# Patient Record
Sex: MALE | Race: OTHER | NOT HISPANIC OR LATINO | URBAN - METROPOLITAN AREA
[De-identification: names, ages, dates, MRNs, and addresses within clinical notes are randomized per-mention and may not be internally consistent; named-entity substitution may affect disease eponyms.]

---

## 2020-09-02 ENCOUNTER — EMERGENCY (EMERGENCY)
Facility: HOSPITAL | Age: 25
LOS: 1 days | Discharge: HOME | End: 2020-09-02
Attending: EMERGENCY MEDICINE | Admitting: EMERGENCY MEDICINE
Payer: MEDICAID

## 2020-09-02 VITALS
WEIGHT: 139.99 LBS | OXYGEN SATURATION: 98 % | DIASTOLIC BLOOD PRESSURE: 97 MMHG | HEIGHT: 67 IN | SYSTOLIC BLOOD PRESSURE: 145 MMHG | HEART RATE: 97 BPM | RESPIRATION RATE: 18 BRPM | TEMPERATURE: 98 F

## 2020-09-02 LAB
ALBUMIN SERPL ELPH-MCNC: 4 G/DL — SIGNIFICANT CHANGE UP (ref 3.4–5)
ALP SERPL-CCNC: 72 U/L — SIGNIFICANT CHANGE UP (ref 40–120)
ALT FLD-CCNC: 20 U/L — SIGNIFICANT CHANGE UP (ref 12–42)
AMPHET UR-MCNC: NEGATIVE — SIGNIFICANT CHANGE UP
ANION GAP SERPL CALC-SCNC: 9 MMOL/L — SIGNIFICANT CHANGE UP (ref 9–16)
APPEARANCE UR: CLEAR — SIGNIFICANT CHANGE UP
APTT BLD: 31.9 SEC — SIGNIFICANT CHANGE UP (ref 27.5–35.5)
AST SERPL-CCNC: 15 U/L — SIGNIFICANT CHANGE UP (ref 15–37)
BACTERIA # UR AUTO: PRESENT /HPF
BARBITURATES UR SCN-MCNC: NEGATIVE — SIGNIFICANT CHANGE UP
BASOPHILS # BLD AUTO: 0.03 K/UL — SIGNIFICANT CHANGE UP (ref 0–0.2)
BASOPHILS NFR BLD AUTO: 0.5 % — SIGNIFICANT CHANGE UP (ref 0–2)
BENZODIAZ UR-MCNC: NEGATIVE — SIGNIFICANT CHANGE UP
BILIRUB SERPL-MCNC: 0.4 MG/DL — SIGNIFICANT CHANGE UP (ref 0.2–1.2)
BILIRUB UR-MCNC: NEGATIVE — SIGNIFICANT CHANGE UP
BUN SERPL-MCNC: 15 MG/DL — SIGNIFICANT CHANGE UP (ref 7–23)
CALCIUM SERPL-MCNC: 8.8 MG/DL — SIGNIFICANT CHANGE UP (ref 8.5–10.5)
CHLORIDE SERPL-SCNC: 104 MMOL/L — SIGNIFICANT CHANGE UP (ref 96–108)
CO2 SERPL-SCNC: 28 MMOL/L — SIGNIFICANT CHANGE UP (ref 22–31)
COCAINE METAB.OTHER UR-MCNC: NEGATIVE — SIGNIFICANT CHANGE UP
COLOR SPEC: YELLOW — SIGNIFICANT CHANGE UP
CREAT SERPL-MCNC: 1.17 MG/DL — SIGNIFICANT CHANGE UP (ref 0.5–1.3)
D DIMER BLD IA.RAPID-MCNC: <187 NG/ML DDU — SIGNIFICANT CHANGE UP
DIFF PNL FLD: ABNORMAL
EOSINOPHIL # BLD AUTO: 0.05 K/UL — SIGNIFICANT CHANGE UP (ref 0–0.5)
EOSINOPHIL NFR BLD AUTO: 0.8 % — SIGNIFICANT CHANGE UP (ref 0–6)
EPI CELLS # UR: SIGNIFICANT CHANGE UP /HPF (ref 0–5)
GLUCOSE SERPL-MCNC: 94 MG/DL — SIGNIFICANT CHANGE UP (ref 70–99)
GLUCOSE UR QL: NEGATIVE — SIGNIFICANT CHANGE UP
HCT VFR BLD CALC: 42.2 % — SIGNIFICANT CHANGE UP (ref 39–50)
HGB BLD-MCNC: 14.2 G/DL — SIGNIFICANT CHANGE UP (ref 13–17)
IMM GRANULOCYTES NFR BLD AUTO: 0.3 % — SIGNIFICANT CHANGE UP (ref 0–1.5)
KETONES UR-MCNC: NEGATIVE — SIGNIFICANT CHANGE UP
LACTATE SERPL-SCNC: 1.1 MMOL/L — SIGNIFICANT CHANGE UP (ref 0.4–2)
LEUKOCYTE ESTERASE UR-ACNC: NEGATIVE — SIGNIFICANT CHANGE UP
LIDOCAIN IGE QN: 85 U/L — SIGNIFICANT CHANGE UP (ref 73–393)
LYMPHOCYTES # BLD AUTO: 2.6 K/UL — SIGNIFICANT CHANGE UP (ref 1–3.3)
LYMPHOCYTES # BLD AUTO: 41.5 % — SIGNIFICANT CHANGE UP (ref 13–44)
MCHC RBC-ENTMCNC: 30.3 PG — SIGNIFICANT CHANGE UP (ref 27–34)
MCHC RBC-ENTMCNC: 33.6 GM/DL — SIGNIFICANT CHANGE UP (ref 32–36)
MCV RBC AUTO: 90 FL — SIGNIFICANT CHANGE UP (ref 80–100)
METHADONE UR-MCNC: NEGATIVE — SIGNIFICANT CHANGE UP
MONOCYTES # BLD AUTO: 0.32 K/UL — SIGNIFICANT CHANGE UP (ref 0–0.9)
MONOCYTES NFR BLD AUTO: 5.1 % — SIGNIFICANT CHANGE UP (ref 2–14)
NEUTROPHILS # BLD AUTO: 3.24 K/UL — SIGNIFICANT CHANGE UP (ref 1.8–7.4)
NEUTROPHILS NFR BLD AUTO: 51.8 % — SIGNIFICANT CHANGE UP (ref 43–77)
NITRITE UR-MCNC: NEGATIVE — SIGNIFICANT CHANGE UP
NRBC # BLD: 0 /100 WBCS — SIGNIFICANT CHANGE UP (ref 0–0)
NT-PROBNP SERPL-SCNC: 15 PG/ML — SIGNIFICANT CHANGE UP
OPIATES UR-MCNC: NEGATIVE — SIGNIFICANT CHANGE UP
PCP SPEC-MCNC: SIGNIFICANT CHANGE UP
PCP UR-MCNC: NEGATIVE — SIGNIFICANT CHANGE UP
PH UR: 7.5 — SIGNIFICANT CHANGE UP (ref 5–8)
PLATELET # BLD AUTO: 245 K/UL — SIGNIFICANT CHANGE UP (ref 150–400)
POTASSIUM SERPL-MCNC: 3.4 MMOL/L — LOW (ref 3.5–5.3)
POTASSIUM SERPL-SCNC: 3.4 MMOL/L — LOW (ref 3.5–5.3)
PROT SERPL-MCNC: 7.2 G/DL — SIGNIFICANT CHANGE UP (ref 6.4–8.2)
PROT UR-MCNC: NEGATIVE MG/DL — SIGNIFICANT CHANGE UP
RBC # BLD: 4.69 M/UL — SIGNIFICANT CHANGE UP (ref 4.2–5.8)
RBC # FLD: 11.3 % — SIGNIFICANT CHANGE UP (ref 10.3–14.5)
RBC CASTS # UR COMP ASSIST: < 5 /HPF — SIGNIFICANT CHANGE UP
SODIUM SERPL-SCNC: 141 MMOL/L — SIGNIFICANT CHANGE UP (ref 132–145)
SP GR SPEC: 1.01 — SIGNIFICANT CHANGE UP (ref 1–1.03)
THC UR QL: NEGATIVE — SIGNIFICANT CHANGE UP
TROPONIN I SERPL-MCNC: <0.017 NG/ML — LOW (ref 0.02–0.06)
UROBILINOGEN FLD QL: 0.2 E.U./DL — SIGNIFICANT CHANGE UP
WBC # BLD: 6.26 K/UL — SIGNIFICANT CHANGE UP (ref 3.8–10.5)
WBC # FLD AUTO: 6.26 K/UL — SIGNIFICANT CHANGE UP (ref 3.8–10.5)
WBC UR QL: < 5 /HPF — SIGNIFICANT CHANGE UP

## 2020-09-02 PROCEDURE — 93010 ELECTROCARDIOGRAM REPORT: CPT

## 2020-09-02 PROCEDURE — 99285 EMERGENCY DEPT VISIT HI MDM: CPT

## 2020-09-02 RX ORDER — METHOCARBAMOL 500 MG/1
1000 TABLET, FILM COATED ORAL ONCE
Refills: 0 | Status: COMPLETED | OUTPATIENT
Start: 2020-09-02 | End: 2020-09-02

## 2020-09-02 RX ORDER — METHOCARBAMOL 500 MG/1
750 TABLET, FILM COATED ORAL ONCE
Refills: 0 | Status: DISCONTINUED | OUTPATIENT
Start: 2020-09-02 | End: 2020-09-02

## 2020-09-02 RX ORDER — ACETAMINOPHEN 500 MG
650 TABLET ORAL ONCE
Refills: 0 | Status: COMPLETED | OUTPATIENT
Start: 2020-09-02 | End: 2020-09-02

## 2020-09-02 RX ADMIN — METHOCARBAMOL 1000 MILLIGRAM(S): 500 TABLET, FILM COATED ORAL at 22:45

## 2020-09-02 RX ADMIN — Medication 650 MILLIGRAM(S): at 22:43

## 2020-09-02 NOTE — ED PROVIDER NOTE - OBJECTIVE STATEMENT
24 male pt, no hx of med problems, nkda. Presents w L upper back pain, radiating to anterior chest at times and mid upper abdomen. ALso at times has L sided abd pain that radiates down to his testicles. Pain is intermitent but has been happening more often in the last 2-3 days. Denies any fever, chills, HA, neck pain, sob, cough, nausea, vomiting, diarrhea. Had similar symptoms several weeks ago and told the epigastric pain was gastritis but pt did dot respond well to Pepcid. No GI F/U so far. no dysuria, no penile DC, testicle pain is intermitent w no swelling, no redness.

## 2020-09-02 NOTE — ED PROVIDER NOTE - GENITOURINARY, MLM
No discharge, lesions. mild tenderness to L anterior testicle, normal lie, normal cremasteric reflex, no swelling, no redness

## 2020-09-02 NOTE — ED ADULT NURSE NOTE - OBJECTIVE STATEMENT
c/o mid to left abd pain radiating to left side mid back x6 weeks worsening and radiating down toward his groin on left side over the last x3 days. Pt was seen by his PMD when it first began and dx with gastritis. has been eating a bland diet since with no relief. Pt also c/o of discomfort  above left testicle x4days. denies strenuous activity. Pt eyes appear pt reports that is normal for him. denies drug use and drank socially before gastritis diagnosis c/o mid to left side back pain radiating to left abd x6 weeks worsening and radiating down toward his groin on left side over the last x3 days. Pt was seen by his PMD when it first began and dx with gastritis. has been eating a bland diet since with no relief. Pt also c/o of discomfort  above left testicle x4days. denies strenuous activity. Pt eyes appear pt reports that is normal for him. denies drug use and drank socially before gastritis diagnosis

## 2020-09-02 NOTE — ED PROVIDER NOTE - CLINICAL SUMMARY MEDICAL DECISION MAKING FREE TEXT BOX
Pt with back pain radiating to chest and upper/left sided abdomen, worsening for the last 3 days. Will get blood work imaging, R/O pulmonary/cardiac etiologies as well as abdominal. Has not had any imaging yet for pain work up that started several weeks ago. EKG non ischemic and pt is well appearing.

## 2020-09-02 NOTE — ED ADULT NURSE NOTE - CHIEF COMPLAINT QUOTE
Pt presents to ED c/o mid to left back pain radiating to left side of abdominal x6 weeks worsening over the last x3 days. Pt was seen at [place] when it first began and dx with gastritis. Pt also c/o left testicular intermittent pain x4days. Pt denies any fever/chills, no N/V/D, no painful urination or change in frequency.

## 2020-09-02 NOTE — ED PROVIDER NOTE - PATIENT PORTAL LINK FT
You can access the FollowMyHealth Patient Portal offered by [facility] by registering at the following website: [email or web address]. By joining [employer]’s FollowMyHealth portal, you will also be able to view your health information using other applications (apps) compatible with our system.

## 2020-09-02 NOTE — ED ADULT TRIAGE NOTE - CHIEF COMPLAINT QUOTE
Pt presents to ED c/o mid to left back pain radiating to left side of abdominal x6 weeks worsening over the last x3 days. Pt was seen at [place] when it first began and dx with gastritis. Pt also c/o left testicular intermittent pain x4days. Pt denies any fever/chills, no N/V/D, no painful urination or change in frequency.

## 2020-09-02 NOTE — ED PROVIDER NOTE - PROGRESS NOTE DETAILS
unremarkable work up except for bacteriuria and trace hematuria. Explained results in detail. Added UCX and GC/Chlam. Covered for STD possibility. Pt will see his PMD on Tuesday. Provided copy of all results, recommend f/u w GI and Urology. Well appearing throughout ED stay. Maybe pain is MSK?, will prescribe motrin/robaxin

## 2020-09-02 NOTE — ED PROVIDER NOTE - CARE PROVIDERS DIRECT ADDRESSES
[email or web address]

## 2020-09-02 NOTE — ED PROVIDER NOTE - CARE PLAN
Principal Discharge DX:	Abdominal pain Principal Discharge DX:	Abdominal pain  Secondary Diagnosis:	Chest wall pain

## 2020-09-02 NOTE — ED PROVIDER NOTE - CARE PROVIDER_API CALL
Jack Walter)  Gastroenterology; Internal Medicine  [address] [place] [address]  Phone: [phone]  Fax: [phone]  Follow Up Time:     Srikanth Meehan)  Urology  [address] [place] [address]  Phone: [phone]  Fax: [phone]  Follow Up Time:

## 2020-09-03 ENCOUNTER — EMERGENCY (EMERGENCY)
Facility: HOSPITAL | Age: 25
LOS: 1 days | Discharge: ANOTHER HOSPITAL | End: 2020-09-03
Attending: EMERGENCY MEDICINE | Admitting: EMERGENCY MEDICINE
Payer: MEDICAID

## 2020-09-03 VITALS
RESPIRATION RATE: 18 BRPM | TEMPERATURE: 98 F | OXYGEN SATURATION: 99 % | WEIGHT: 139.99 LBS | SYSTOLIC BLOOD PRESSURE: 134 MMHG | HEART RATE: 74 BPM | HEIGHT: 67 IN | DIASTOLIC BLOOD PRESSURE: 80 MMHG

## 2020-09-03 VITALS
HEART RATE: 66 BPM | OXYGEN SATURATION: 100 % | RESPIRATION RATE: 18 BRPM | DIASTOLIC BLOOD PRESSURE: 80 MMHG | TEMPERATURE: 98 F | SYSTOLIC BLOOD PRESSURE: 130 MMHG

## 2020-09-03 PROCEDURE — 99284 EMERGENCY DEPT VISIT MOD MDM: CPT

## 2020-09-03 PROCEDURE — 71275 CT ANGIOGRAPHY CHEST: CPT | Mod: 26

## 2020-09-03 PROCEDURE — 74174 CTA ABD&PLVS W/CONTRAST: CPT | Mod: 26

## 2020-09-03 RX ORDER — METHOCARBAMOL 500 MG/1
1 TABLET, FILM COATED ORAL
Qty: 15 | Refills: 0
Start: 2020-09-03 | End: 2020-09-07

## 2020-09-03 RX ORDER — METHOCARBAMOL 500 MG/1
2 TABLET, FILM COATED ORAL
Qty: 15 | Refills: 0
Start: 2020-09-03

## 2020-09-03 RX ORDER — CEFTRIAXONE 500 MG/1
250 INJECTION, POWDER, FOR SOLUTION INTRAMUSCULAR; INTRAVENOUS ONCE
Refills: 0 | Status: COMPLETED | OUTPATIENT
Start: 2020-09-03 | End: 2020-09-03

## 2020-09-03 RX ORDER — IBUPROFEN 200 MG
1 TABLET ORAL
Qty: 20 | Refills: 0
Start: 2020-09-03 | End: 2020-09-07

## 2020-09-03 RX ORDER — AZITHROMYCIN 500 MG/1
1000 TABLET, FILM COATED ORAL ONCE
Refills: 0 | Status: COMPLETED | OUTPATIENT
Start: 2020-09-03 | End: 2020-09-03

## 2020-09-03 RX ORDER — IBUPROFEN 200 MG
1 TABLET ORAL
Qty: 30 | Refills: 0
Start: 2020-09-03

## 2020-09-03 RX ADMIN — AZITHROMYCIN 1000 MILLIGRAM(S): 500 TABLET, FILM COATED ORAL at 02:25

## 2020-09-03 RX ADMIN — CEFTRIAXONE 250 MILLIGRAM(S): 500 INJECTION, POWDER, FOR SOLUTION INTRAMUSCULAR; INTRAVENOUS at 02:25

## 2020-09-03 RX ADMIN — Medication 650 MILLIGRAM(S): at 01:55

## 2020-09-03 NOTE — ED ADULT NURSE NOTE - CHPI ED NUR SYMPTOMS NEG
no hematuria/no dysuria/no blood in stool/no chills/no nausea/no abdominal distension/no diarrhea/no fever

## 2020-09-03 NOTE — ED ADULT TRIAGE NOTE - CHIEF COMPLAINT QUOTE
Pt walks in c/o LLQ and L testicular pain for approx 1 hr. Pt seen here last night for LUQ pain that resolved. Pt c/o diarrhea x1 this AM and current nausea. Pt denies fever, vomiting, dysuria and PMH of testicular torsion.

## 2020-09-04 ENCOUNTER — EMERGENCY (EMERGENCY)
Facility: HOSPITAL | Age: 25
LOS: 1 days | Discharge: HOME | End: 2020-09-04
Attending: EMERGENCY MEDICINE | Admitting: EMERGENCY MEDICINE
Payer: MEDICAID

## 2020-09-04 VITALS
RESPIRATION RATE: 18 BRPM | HEIGHT: 67 IN | TEMPERATURE: 98 F | HEART RATE: 65 BPM | OXYGEN SATURATION: 98 % | SYSTOLIC BLOOD PRESSURE: 124 MMHG | DIASTOLIC BLOOD PRESSURE: 94 MMHG

## 2020-09-04 VITALS
OXYGEN SATURATION: 99 % | RESPIRATION RATE: 17 BRPM | SYSTOLIC BLOOD PRESSURE: 132 MMHG | HEART RATE: 77 BPM | DIASTOLIC BLOOD PRESSURE: 91 MMHG

## 2020-09-04 VITALS
DIASTOLIC BLOOD PRESSURE: 81 MMHG | OXYGEN SATURATION: 100 % | TEMPERATURE: 98 F | HEART RATE: 63 BPM | RESPIRATION RATE: 18 BRPM | SYSTOLIC BLOOD PRESSURE: 118 MMHG

## 2020-09-04 LAB
ALBUMIN SERPL ELPH-MCNC: 4.4 G/DL — SIGNIFICANT CHANGE UP (ref 3.4–5)
ALP SERPL-CCNC: 78 U/L — SIGNIFICANT CHANGE UP (ref 40–120)
ALT FLD-CCNC: 17 U/L — SIGNIFICANT CHANGE UP (ref 12–42)
ANION GAP SERPL CALC-SCNC: 10 MMOL/L — SIGNIFICANT CHANGE UP (ref 9–16)
APTT BLD: 33.9 SEC — SIGNIFICANT CHANGE UP (ref 27.5–35.5)
AST SERPL-CCNC: 21 U/L — SIGNIFICANT CHANGE UP (ref 15–37)
BASOPHILS # BLD AUTO: 0.02 K/UL — SIGNIFICANT CHANGE UP (ref 0–0.2)
BASOPHILS NFR BLD AUTO: 0.4 % — SIGNIFICANT CHANGE UP (ref 0–2)
BILIRUB SERPL-MCNC: 0.5 MG/DL — SIGNIFICANT CHANGE UP (ref 0.2–1.2)
BUN SERPL-MCNC: 15 MG/DL — SIGNIFICANT CHANGE UP (ref 7–23)
C TRACH RRNA SPEC QL NAA+PROBE: SIGNIFICANT CHANGE UP
CALCIUM SERPL-MCNC: 9.3 MG/DL — SIGNIFICANT CHANGE UP (ref 8.5–10.5)
CHLORIDE SERPL-SCNC: 106 MMOL/L — SIGNIFICANT CHANGE UP (ref 96–108)
CO2 SERPL-SCNC: 28 MMOL/L — SIGNIFICANT CHANGE UP (ref 22–31)
CREAT SERPL-MCNC: 1.2 MG/DL — SIGNIFICANT CHANGE UP (ref 0.5–1.3)
CULTURE RESULTS: SIGNIFICANT CHANGE UP
EOSINOPHIL # BLD AUTO: 0.06 K/UL — SIGNIFICANT CHANGE UP (ref 0–0.5)
EOSINOPHIL NFR BLD AUTO: 1.1 % — SIGNIFICANT CHANGE UP (ref 0–6)
GLUCOSE SERPL-MCNC: 93 MG/DL — SIGNIFICANT CHANGE UP (ref 70–99)
HCT VFR BLD CALC: 43.8 % — SIGNIFICANT CHANGE UP (ref 39–50)
HGB BLD-MCNC: 14.9 G/DL — SIGNIFICANT CHANGE UP (ref 13–17)
IMM GRANULOCYTES NFR BLD AUTO: 0.4 % — SIGNIFICANT CHANGE UP (ref 0–1.5)
INR BLD: 1.03 — SIGNIFICANT CHANGE UP (ref 0.88–1.16)
LYMPHOCYTES # BLD AUTO: 2.56 K/UL — SIGNIFICANT CHANGE UP (ref 1–3.3)
LYMPHOCYTES # BLD AUTO: 45.6 % — HIGH (ref 13–44)
MCHC RBC-ENTMCNC: 30.5 PG — SIGNIFICANT CHANGE UP (ref 27–34)
MCHC RBC-ENTMCNC: 34 GM/DL — SIGNIFICANT CHANGE UP (ref 32–36)
MCV RBC AUTO: 89.8 FL — SIGNIFICANT CHANGE UP (ref 80–100)
MONOCYTES # BLD AUTO: 0.27 K/UL — SIGNIFICANT CHANGE UP (ref 0–0.9)
MONOCYTES NFR BLD AUTO: 4.8 % — SIGNIFICANT CHANGE UP (ref 2–14)
N GONORRHOEA RRNA SPEC QL NAA+PROBE: SIGNIFICANT CHANGE UP
NEUTROPHILS # BLD AUTO: 2.69 K/UL — SIGNIFICANT CHANGE UP (ref 1.8–7.4)
NEUTROPHILS NFR BLD AUTO: 47.7 % — SIGNIFICANT CHANGE UP (ref 43–77)
NRBC # BLD: 0 /100 WBCS — SIGNIFICANT CHANGE UP (ref 0–0)
PLATELET # BLD AUTO: 238 K/UL — SIGNIFICANT CHANGE UP (ref 150–400)
POTASSIUM SERPL-MCNC: 3.6 MMOL/L — SIGNIFICANT CHANGE UP (ref 3.5–5.3)
POTASSIUM SERPL-SCNC: 3.6 MMOL/L — SIGNIFICANT CHANGE UP (ref 3.5–5.3)
PROT SERPL-MCNC: 7.6 G/DL — SIGNIFICANT CHANGE UP (ref 6.4–8.2)
PROTHROM AB SERPL-ACNC: 12.1 SEC — SIGNIFICANT CHANGE UP (ref 10.6–13.6)
RBC # BLD: 4.88 M/UL — SIGNIFICANT CHANGE UP (ref 4.2–5.8)
RBC # FLD: 11.4 % — SIGNIFICANT CHANGE UP (ref 10.3–14.5)
SODIUM SERPL-SCNC: 144 MMOL/L — SIGNIFICANT CHANGE UP (ref 132–145)
SPECIMEN SOURCE: SIGNIFICANT CHANGE UP
SPECIMEN SOURCE: SIGNIFICANT CHANGE UP
WBC # BLD: 5.62 K/UL — SIGNIFICANT CHANGE UP (ref 3.8–10.5)
WBC # FLD AUTO: 5.62 K/UL — SIGNIFICANT CHANGE UP (ref 3.8–10.5)

## 2020-09-04 PROCEDURE — 76870 US EXAM SCROTUM: CPT | Mod: 26

## 2020-09-04 PROCEDURE — 99284 EMERGENCY DEPT VISIT MOD MDM: CPT

## 2020-09-04 PROCEDURE — 76870 US EXAM SCROTUM: CPT

## 2020-09-04 RX ORDER — KETOROLAC TROMETHAMINE 30 MG/ML
30 SYRINGE (ML) INJECTION ONCE
Refills: 0 | Status: DISCONTINUED | OUTPATIENT
Start: 2020-09-04 | End: 2020-09-04

## 2020-09-04 RX ADMIN — Medication 30 MILLIGRAM(S): at 00:42

## 2020-09-04 NOTE — ED PROVIDER NOTE - OBJECTIVE STATEMENT
24 male pt, no hx of med problems, nkda. Presents w L sided testicular pain that started tonight suddenly. Radiation to the inguinal area. no dysuria. Of note pt was evaluated last night for upper back, chest pain and some episodes of abd pain. UA had showed some trace hematuria and bacteriuria. covered for possibility of STD, cultures pending. Pt has no dysuria, no penile dc. Today the L testicle developed redness and swelling suddenly around 9 pm when symptoms developed.

## 2020-09-04 NOTE — ED PROVIDER NOTE - OBJECTIVE STATEMENT
24M no PMH p/w L testicular pain. Pt initially presented to [facility] 1d ago w/ back pain/abd pain radiating to testicles. Had labs/UA/utox/CTA chest a/p all w/o any acute pathology. Received empiric STD tx w/ CTX/azithro. Returned to [facility] w/ L testicular pain, repeat labs wnl, transferred to [facility] for torsion eval. Received toradol tonight. 24M no PMH p/w L testicular pain. Pt initially presented to [facility] 1d ago w/ back pain/abd pain radiating to testicles. Had labs/UA/utox/CTA chest a/p all w/o any acute pathology. Received empiric STD tx w/ CTX/azithro. Returned to [facility] w/ L testicular pain, repeat labs wnl, transferred to [facility] for torsion eval. Received toradol tonight w/ improvement in pain. Pt states that ~2100 tonight he developed gradual onset L testicular pain. Denies f/c, NVD, black/bloody stool, urinary complaints, focal weakness/numbness, lightheadedness, current back/abd pain pain, penile pain, rashes, recent travel, recent antibiotics, sick contacts, SOB/CP, URI symptoms. Normal PO intake, normal BMs.

## 2020-09-04 NOTE — ED ADULT NURSE NOTE - TEMPLATE
Spoke with patient who indicated he would be keeping his appointment with wound care as scheduled for 6/19/18 at 1320.  
 Symptoms

## 2020-09-04 NOTE — ED ADULT NURSE NOTE - NSIMPLEMENTINTERV_GEN_ALL_ED
Implemented All Universal Safety Interventions:  [place] to call system. Call bell, personal items and telephone within reach. Instruct patient to call for assistance. Room bathroom lighting operational. Non-slip footwear when patient is off stretcher. Physically safe environment: no spills, clutter or unnecessary equipment. Stretcher in lowest position, wheels locked, appropriate side rails in place.

## 2020-09-04 NOTE — ED ADULT NURSE NOTE - OBJECTIVE STATEMENT
Patient is a transfer from [facility] with complaint of worsening L testicular pain beginning 2 days ago for US.  Endorsed that pain began in testicle radiates to LLQ and to L flank denies injury fever chills any urinary or bowel disfunction.

## 2020-09-04 NOTE — ED PROVIDER NOTE - CLINICAL SUMMARY MEDICAL DECISION MAKING FREE TEXT BOX
Pt with testicular pain, sudden onset tonight. on exam some erythema and yesterday which is different than his exam yesterday. Will transfer to [facility] ED for emergent US. Spoke to Dr Gross in the ED at [facility], accepts transfer Tier 1. Pt with testicular pain, sudden onset tonight. on exam some erythema/swelling, which is different than his exam yesterday. Will transfer to [facility] ED for emergent US. Spoke to Dr Gross in the ED at [facility], accepts transfer Tier 1.

## 2020-09-04 NOTE — ED PROVIDER NOTE - CLINICAL SUMMARY MEDICAL DECISION MAKING FREE TEXT BOX
24M no PMH p/w L testicular pain. Pt initially presented to [facility] 1d ago w/ back pain/abd pain radiating to testicles. Had labs/UA/utox/CTA chest a/p all w/o any acute pathology. Received empiric STD tx w/ CTX/azithro. Returned to [facility] w/ L testicular pain, repeat labs wnl, transferred to [facility] for torsion eval. Received toradol tonight w/ improvement in pain. Pt states that ~2100 tonight he developed gradual onset L testicular pain. No other systemic symptoms. Vitals wnl, exam as above. Very well appearing.  ddx: Low suspicion for torsion. Possible epididymitis.   US, reassess.

## 2020-09-04 NOTE — ED PROVIDER NOTE - GENITOURINARY, MLM
No discharge, lesions. L testicle with erythema and mild swelling, difusely tender to palpation, chaperone - PCT Rodolfo

## 2020-09-04 NOTE — ED PROVIDER NOTE - NSFOLLOWUPINSTRUCTIONS_ED_ALL_ED_FT
Can take tylenol every 6hrs as needed for pain.  Can also take motrin 600mg every 6hrs as needed for pain (WARNING: Use may cause stomach issues/problems. Take with food. Prolonged use can also cause kidney issues.).   Stay well hydrated.  Return for fevers, persistent vomit, uncontrolled pain, worsening breathing, worsening lightheaded, unable to urinate.  Follow up with primary doctor within 1-2 days.   Follow up with urologist. Can call [phone] to schedule appointment.

## 2020-09-06 DIAGNOSIS — R10.13 EPIGASTRIC PAIN: ICD-10-CM

## 2020-09-06 DIAGNOSIS — R07.89 OTHER CHEST PAIN: ICD-10-CM

## 2020-09-07 DIAGNOSIS — L53.9 ERYTHEMATOUS CONDITION, UNSPECIFIED: ICD-10-CM

## 2020-09-07 DIAGNOSIS — N50.812 LEFT TESTICULAR PAIN: ICD-10-CM

## 2020-09-07 DIAGNOSIS — R10.9 UNSPECIFIED ABDOMINAL PAIN: ICD-10-CM

## 2020-09-07 DIAGNOSIS — N50.89 OTHER SPECIFIED DISORDERS OF THE MALE GENITAL ORGANS: ICD-10-CM

## 2020-09-08 DIAGNOSIS — I86.1 SCROTAL VARICES: ICD-10-CM

## 2020-09-08 DIAGNOSIS — N50.812 LEFT TESTICULAR PAIN: ICD-10-CM

## 2020-09-09 PROBLEM — Z00.00 ENCOUNTER FOR PREVENTIVE HEALTH EXAMINATION: Status: ACTIVE | Noted: 2020-09-09

## 2021-11-18 NOTE — ED PROVIDER NOTE - CROS ED CONS ALL NEG
Additional Notes: Patient consent was obtained to proceed with the visit and recommended plan of care after discussion of all risks and benefits, including the risks of COVID-19 exposure. Detail Level: Simple negative...

## 2022-04-20 NOTE — ED ADULT NURSE NOTE - CAS EDN DISCHARGE ASSESSMENT
INPATIENT  ENCOUNTER  HEMATOLOGY/ONCOLOGY CONSULT NOTE    ADMISSION DATE:  4/16/2022  Date of Consult: 4/20/2022  CONSULTING PHYSICIAN:  Penny Armstrong MD  ATTENDING PHYSICIAN:  Rowan Gill MD   CODE STATUS:  Do Not Resuscitate    Consults     REASON FOR CONSULT: Squamous cell carcinoma left lower lung    HISTORY OF PRESENT ILLNESS:   I was asked to see José Ramirez for an initial hematology / oncology consultation during this hospitalization by Rowan Gill MD.   José Ramirez is a 64 year old male patient admitted with chest pain.          HISTORIES:    64-year-old man with history of HIV, coronary artery disease status post CABG and PCI, TIA, hyperlipidemia, hypertension, squamous cell carcinoma of the left lung who presents with symptoms of chest pain.  He presented to [facility] ED with complaints of substernal chest pain on 4/16/2022.  He notes chronic left-sided chest pain which he notes from time to time feels like angina.  He reports that on the day of presentation he noted chest pain which felt like angina took sublingual nitroglycerin with relief of symptoms.  During the current hospitalization he has been evaluated by cardiology. Patient notes ongoing chest pain throughout this hospitalization which is not associated with ischemic ECG changes or troponin elevation.  Evaluation has included, echocardiogram, coronary CTA; nuclear stress test.    Patient reports progressive difficulty and pain with swallowing.  He is limited to a liquid diet.  He reports gradual weight loss over the past several months.  Denies hemoptysis.  Notes that his whole body hurts.      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  5/5/2021 CT chest showed left lower lobe hilar mass and multiple left lower lobe small nodules.  6/24/2021 bronchoscopy with left lower lobe biopsy.  Pathology, left lower lung squamous cell carcinoma.  9/2021 PET/CT scan demonstrated  localized disease to the left lung with likely mediastinal invasion.   9/22/2021 oncology visit note: [facility] tumor board recommendation was definitive chemoradiation.  Recent imaging obtained at [facility] suspicious for metastatic disease.  2/3/2022 CT Chest Abdomen and Pelvis [facility]:  [facility] RADIOLOGY [facility] - 02/03/2022 4:24 PM CST      1.  Interval increase in size of left lower lobe posterior hilar mass with postobstructive pneumonia versus tumor infiltration, corresponding to patient's biopsy proven squamous cell lung cancer.  This lesion has increased in size and now appears to involve the left main bronchus with associated narrowing, as well as infiltrates/involves the left inferior pulmonary vein. In addition the mass lesion abuts the distal esophagus without a distinct fat plane identified, the esophagus is also likely infiltrated.  2. Hypoattenuating lesion is seen within the right lobe of the liver, suspicious for metastatic disease.  3. Multiple lung nodules are seen within the right lower lobe, similar in size to May 2021, nonspecific, attention on follow-up.  4. Severe emphysema.  5. Similar ectasia of the ascending aorta.      Declined treatment secondary to concerns regarding toxicities of treatment.  Has been followed by palliative care at [facility].      Past Medical History:   Diagnosis Date   • HIV (human immunodeficiency virus infection) (CMS/HCC)    • HTN (hypertension)    • Lung cancer (CMS/HCC)    • MI (myocardial infarction) (CMS/HCC)    • S/P CABG (coronary artery bypass graft) 4/16/2022   • Status post coronary artery stent placement 4/16/2022      History reviewed. No pertinent surgical history.   Medications Prior to Admission   Medication Sig Dispense Refill   • amLODIPine (NORVASC) 10 MG tablet Take 10 mg by mouth daily.     • clopidogrel (PLAVIX) 75 MG tablet Take 75 mg by mouth daily.      • nitroGLYcerin (NITROSTAT) 0.4 MG sublingual tablet Place 0.4 mg under the tongue.     • dapsone 100 MG  tablet Take 100 mg by mouth daily.     • Juluca 50-25 MG Tab Take 1 tablet by mouth daily.     • HYDROmorphone (DILAUDID) 4 MG tablet Take 8 mg by mouth every 4 hours as needed for Pain (Breakthrough pain).      • naLOXone (NARCAN) 4 MG/0.1ML nasal spray Spray 4 mg in each nostril.     • Sennosides (Senna) 8.6 MG Tab Take 1 tablet by mouth daily as needed for Constipation.     • fentaNYL (DURAGESIC) 25 MCG/HR patch Place 1 patch onto the skin every 3 days.     • [DISCONTINUED] lisinopril (ZESTRIL) 5 MG tablet Take 5 mg by mouth daily.      • [DISCONTINUED] aspirin 81 MG EC tablet Take 81 mg by mouth daily.       ALLERGIES:   Allergen Reactions   • Sulfamethoxazole-Trimethoprim RASH      Social History     Tobacco Use   • Smoking status: Never Smoker   • Smokeless tobacco: Never Used   Substance Use Topics   • Alcohol use: Not Currently      History reviewed. No pertinent family history.     REVIEW OF SYSTEMS:    Review of Systems      SCHEDULED MEDS:  Current Facility-Administered Medications   Medication Dose Route Frequency Provider Last Rate Last Admin   • [Held by provider] metoPROLOL tartrate (LOPRESSOR) tablet 12.5 mg  12.5 mg Oral 2 times per day Jose De Jesus Jc Jefferson       • HYDROmorphone (DILAUDID) injection 1.5 mg  1.5 mg Intravenous Q4H PRN Son Chawla MD   1.5 mg at 04/20/22 0711   • metoPROLOL (LOPRESSOR) injection 5 mg  5 mg Intravenous 2 times per day Son Chawla MD       • fentaNYL (DURAGESIC) 12 MCG/HR patch 1 patch  1 patch Transdermal Q3 Days Antonieta Kirby MD   1 patch at 04/17/22 1050   • rilpivirine HCl (EDURANT) tablet 25 mg  25 mg Oral Daily with breakfast Antonieta Kirby MD   25 mg at 04/19/22 1628    And   • dolutegravir (TIVICAY) tablet 50 mg  50 mg Oral Daily with breakfast Antonieta Kirby MD   50 mg at 04/19/22 1628   • sodium chloride 0.9 % flush bag 25 mL  25 mL Intravenous PRN Ramírez Christopher MD       • sodium chloride (PF) 0.9 % injection 2 mL  2 mL Intracatheter 2 times per day  Ramírez Christopher MD   2 mL at 04/19/22 2133   • Potassium Standard Replacement Protocol   Does not apply See Admin Instructions Ramírez Christopher MD       • Magnesium Standard Replacement Protocol   Does not apply See Admin Instructions Ramírez Chrsitopher MD       • ondansetron (ZOFRAN) injection 4 mg  4 mg Intravenous BID PRN Ramírez Christopher MD   4 mg at 04/16/22 1540   • acetaminophen (TYLENOL) tablet 650 mg  650 mg Oral Q4H PRN Ramírez Christopher MD       • polyethylene glycol (MIRALAX) packet 17 g  17 g Oral Daily PRN Ramírez Christopher MD       • docusate sodium-sennosides (SENOKOT S) 50-8.6 MG 2 tablet  2 tablet Oral Daily PRN Ramírez Christopher MD   2 tablet at 04/16/22 1540   • aluminum-magnesium hydroxide-simethicone (MAALOX) 200-200-20 MG/5ML suspension 30 mL  30 mL Oral Q4H PRN Ramírez Christopher MD       • sodium chloride 0.9 % flush bag 25 mL  25 mL Intravenous PRN Ramírez Christopher MD       • sodium chloride (NORMAL SALINE) 0.9 % bolus 500 mL  500 mL Intravenous PRN Ramírez Christopher MD       • enoxaparin (LOVENOX) injection 40 mg  40 mg Subcutaneous Daily Ramírez Christopher MD   40 mg at 04/19/22 0759   • nitroGLYcerin (NITROSTAT) sublingual tablet 0.4 mg  0.4 mg Sublingual Q5 Min PRN Ramírez Christopher MD   0.4 mg at 04/19/22 0806   • amLODIPine (NORVASC) tablet 10 mg  10 mg Oral Daily Ramírez Christopher MD   10 mg at 04/19/22 1628   • clopidogrel (PLAVIX) tablet 75 mg  75 mg Oral Daily Ramírez Christopher MD   75 mg at 04/19/22 1628   • atorvastatin (LIPITOR) tablet 40 mg  40 mg Oral Nightly Ramírez Christopher MD   40 mg at 04/18/22 2002   • dapsone tablet 100 mg  100 mg Oral Daily Ramírez Christopher MD   100 mg at 04/19/22 1628         OBJECTIVE:    VITAL SIGNS:    Vital Last Value 24 Hour Range   Temperature 97.7 °F (36.5 °C) (04/20/22 0720) Temp  Min: 97.5 °F (36.4 °C)  Max: 97.8 °F (36.6 °C)   Pulse (!) 57 (04/20/22 0720) Pulse  Min: 35  Max: 58   Respiratory 14 (04/20/22 0430) Resp  Min: 14  Max: 20   Non-Invasive  Blood Pressure  97/47 (04/20/22 0720) BP  Min: 97/47  Max: 123/76   Pulse Oximetry 95 % (04/20/22 0720) SpO2  Min: 94 %  Max: 96 %     Vital Today Admitted   Weight 57.5 kg (126 lb 12.2 oz) (04/17/22 0533) Weight: 58.2 kg (128 lb 4.9 oz) (04/16/22 0649)   Height N/A Height: 5' 9\" (175.3 cm) (04/16/22 0649)   BMI N/A BMI (Calculated): 18.95 (04/16/22 0649)     PHYSICAL EXAMINATION:    Physical Exam      LABORATORY DATA:    Recent Results (from the past 24 hour(s))   Electrocardiogram 12-Lead    Collection Time: 04/19/22  9:21 AM   Result Value Ref Range    Ventricular Rate EKG/Min (BPM) 57     Atrial Rate (BPM) 57     IL-Interval (MSEC) 126     QRS-Interval (MSEC) 88     QT-Interval (MSEC) 410     QTc 399     P Axis (Degrees) -15     R Axis (Degrees) -8     T Axis (Degrees) 67     REPORT TEXT       Sinus bradycardia  Septal infarct  , age undetermined  Abnormal ECG  When compared with ECG of  16-APR-2022 10:39,  QRS axis  shifted right  Septal infarct  is now  present     TROPONIN I, HIGH SENSITIVITY    Collection Time: 04/19/22  9:27 AM   Result Value Ref Range    Troponin I, High Sensitivity 29 <77 ng/L   Stress test only    Collection Time: 04/19/22  2:51 PM   Result Value Ref Range    Resting HR Achieved 54 BPM    Baseline /76 mmHg    O2 sat rest 95 %    Stress peak HR 85 bpm    HEART RATE RESERVE PREDICTED 44.81 BPM    Percent HR 55 %    Post peak /70 mmHg    O2 sat peak 96 %    Predicted HR Max 154 BPM   Magnesium    Collection Time: 04/20/22  5:40 AM   Result Value Ref Range    Magnesium 1.9 1.7 - 2.4 mg/dL   Rapid SARS-CoV-2 by PCR    Collection Time: 04/20/22  5:43 AM    Specimen: Nasal, Mid-turbinate; Swab   Result Value Ref Range    Rapid SARS-COV-2 by PCR Not Detected Not Detected / Detected / Presumptive Positive / Inhibitors present    Isolation Guidelines      Procedural Comment     Comprehensive Metabolic Panel    Collection Time: 04/20/22  6:52 AM   Result Value Ref Range    Fasting Status      Sodium  136 135 - 145 mmol/L    Potassium 3.9 3.4 - 5.1 mmol/L    Chloride 103 98 - 107 mmol/L    Carbon Dioxide 27 21 - 32 mmol/L    Anion Gap 10 10 - 20 mmol/L    Glucose 93 70 - 99 mg/dL    BUN 18 6 - 20 mg/dL    Creatinine 0.69 0.67 - 1.17 mg/dL    Glomerular Filtration Rate >90 >=60    BUN/ Creatinine Ratio 26 (H) 7 - 25    Calcium 9.5 8.4 - 10.2 mg/dL    Bilirubin, Total 0.9 0.2 - 1.0 mg/dL    GOT/AST 16 <=37 Units/L    GPT/ALT 13 <64 Units/L    Alkaline Phosphatase 74 45 - 117 Units/L    Albumin 2.6 (L) 3.6 - 5.1 g/dL    Protein, Total 7.2 6.4 - 8.2 g/dL    Globulin 4.6 (H) 2.0 - 4.0 g/dL    A/G Ratio 0.6 (L) 1.0 - 2.4         IMAGING STUDIES:    [facility]  EXAM:   1.  CT OF THE CHEST WITH CONTRAST.   2.  CT OF THE ABDOMEN AND PELVIS WITH CONTRAST.     DATE: 2/3/2022 3:26 PM     CLINICAL INDICATION: 64-year-old, patient with left-sided squamous cell lung cancer, metastatic disease evaluation.     COMPARISON: PET/CT dated September 16, 2021. CT chest without contrast dated May 5, 2021.     TECHNIQUE:  Multidetector multiplanar CT images were obtained through the chest, abdomen and pelvis after the administration of 90 cc Omnipaque IV contrast material.     FINDINGS:       Per technologist, patient was unable to lie supine on the scanning table secondary to back pain, prone positioning was performed.       MEDIASTINUM and LUNGS: There is no mediastinal or right hilar lymphadenopathy. There is severe centrilobular and paraseptal emphysema.     Redemonstration of a large left posterior hilar mass lesion with postobstructive atelectasis/tumor invasion, involving the mediastinum. This tumor is increased in size from prior examination and now measures at least 8.0 x 4.7 cm in the AP by transverse axial dimensions, previously measuring 5.0 x 3.4 cm when measured similarly.     This tumor has grown in size and now closely abuts and is inseparable from the esophagus, surrounds and is adjacent to the left bronchus resulting in  narrowing of the left main bronchus, and appears to extend into and involve the left inferior pulmonary vein.       Within the right lung base there is a 3 mm pulmonary nodule (series 3, image 54).   Additional 6 mm nodule seen within the right lower lobe (series 3, image 45).   5 mm nodule is seen within the right lower lobe on series 3, image 43.   These lung nodules within the right lung base are similar in size to that seen in the CT chest on May 5, 2021.     Redemonstration of a moderate size left-sided fat-containing Bochdalek hernia.     AXILLA: There is no axillary adenopathy.     HEART AND GREAT VESSELS:  The heart is mildly enlarged. Postsurgical changes of CABG, with multiple overlying sternotomy wires. There is no pericardial effusion. There is similar ectasia of the ascending aorta measuring up to 4.3 cm. There is conventional three vessel anatomy of the great vessels.       AIRWAY: There is narrowing of the left main bronchus by the left-sided hilar large tumor.     LIVER: The liver is not enlarged. Within the right lobe of the liver there is a 0.9 cm hypoattenuating lesion. The portal vein is patent.     GALLBLADDER AND BILIARY SYSTEM: The gallbladder is not dilated. No radiopaque gallstones are seen. The common bile duct is not dilated.     SPLEEN: Not enlarged without focal lesion.     PANCREAS: There is probable focal fat infiltrating the pancreatic tail for example seen on series 2, image 63. There is no evidence of pancreatic ductal dilatation.     ADRENAL GLANDS: There is thickening of the right adrenal gland. There is thinning/nodularity of the left adrenal measuring up to 0.9 cm, similar to prior examination.     KIDNEYS: Kidneys enhance symmetrically without contour deforming suspicious renal lesion. Subcentimeter hypoattenuating lesions bilaterally are too small to characterize. Similar mild right-sided pelviectasis without obstructing lesion identified.     STOMACH: Mild thickening of the  gastric wall likely secondary to underdistention.     BOWEL: The loops of small bowel are normal in caliber without evidence of mechanical obstruction. A duodenal diverticulum is seen. 4A normal appendix is seen in the right lower quadrant. The colon is unremarkable.     PERITONEUM AND RETROPERITONEUM: There is no intraperitoneal free air or intra-abdominal fluid collection. The abdominal aorta demonstrates mild atherosclerotic calcifications without focal dilatation.There is a mildly prominent left periaortic lymph node at the level of the left renal vein (series 2, image 62).     PELVIS: The urinary bladder is partially distended and unremarkable.  The prostate is not enlarged. There is no pelvic lymphadenopathy.     SOFT TISSUES: Grossly unremarkable.     BONES: There is a prominent sclerotic lesion within the right iliac bone, nonspecific, attention on follow-up..    Procedure Note    Edson Lucas MD - 02/03/2022   Formatting of this note might be different from the original.   EXAM:   1.  CT OF THE CHEST WITH CONTRAST.   2.  CT OF THE ABDOMEN AND PELVIS WITH CONTRAST.     DATE: 2/3/2022 3:26 PM     CLINICAL INDICATION: 64-year-old, patient with left-sided squamous cell lung cancer, metastatic disease evaluation.     COMPARISON: PET/CT dated September 16, 2021. CT chest without contrast dated May 5, 2021.     TECHNIQUE:  Multidetector multiplanar CT images were obtained through the chest, abdomen and pelvis after the administration of 90 cc Omnipaque IV contrast material.     FINDINGS:       Per technologist, patient was unable to lie supine on the scanning table secondary to back pain, prone positioning was performed.       MEDIASTINUM and LUNGS: There is no mediastinal or right hilar lymphadenopathy. There is severe centrilobular and paraseptal emphysema.     Redemonstration of a large left posterior hilar mass lesion with postobstructive atelectasis/tumor invasion, involving the mediastinum. This tumor  is increased in size from prior examination and now measures at least 8.0 x 4.7 cm in the AP by transverse axial dimensions, previously measuring 5.0 x 3.4 cm when measured similarly.     This tumor has grown in size and now closely abuts and is inseparable from the esophagus, surrounds and is adjacent to the left bronchus resulting in narrowing of the left main bronchus, and appears to extend into and involve the left inferior pulmonary vein.       Within the right lung base there is a 3 mm pulmonary nodule (series 3, image 54).   Additional 6 mm nodule seen within the right lower lobe (series 3, image 45).   5 mm nodule is seen within the right lower lobe on series 3, image 43.   These lung nodules within the right lung base are similar in size to that seen in the CT chest on May 5, 2021.     Redemonstration of a moderate size left-sided fat-containing Bochdalek hernia.     AXILLA: There is no axillary adenopathy.     HEART AND GREAT VESSELS:  The heart is mildly enlarged. Postsurgical changes of CABG, with multiple overlying sternotomy wires. There is no pericardial effusion. There is similar ectasia of the ascending aorta measuring up to 4.3 cm. There is conventional three vessel anatomy of the great vessels.       AIRWAY: There is narrowing of the left main bronchus by the left-sided hilar large tumor.     LIVER: The liver is not enlarged. Within the right lobe of the liver there is a 0.9 cm hypoattenuating lesion. The portal vein is patent.     GALLBLADDER AND BILIARY SYSTEM: The gallbladder is not dilated. No radiopaque gallstones are seen. The common bile duct is not dilated.     SPLEEN: Not enlarged without focal lesion.     PANCREAS: There is probable focal fat infiltrating the pancreatic tail for example seen on series 2, image 63. There is no evidence of pancreatic ductal dilatation.     ADRENAL GLANDS: There is thickening of the right adrenal gland. There is thinning/nodularity of the left adrenal  measuring up to 0.9 cm, similar to prior examination.     KIDNEYS: Kidneys enhance symmetrically without contour deforming suspicious renal lesion. Subcentimeter hypoattenuating lesions bilaterally are too small to characterize. Similar mild right-sided pelviectasis without obstructing lesion identified.     STOMACH: Mild thickening of the gastric wall likely secondary to underdistention.     BOWEL: The loops of small bowel are normal in caliber without evidence of mechanical obstruction. A duodenal diverticulum is seen. 4A normal appendix is seen in the right lower quadrant. The colon is unremarkable.     PERITONEUM AND RETROPERITONEUM: There is no intraperitoneal free air or intra-abdominal fluid collection. The abdominal aorta demonstrates mild atherosclerotic calcifications without focal dilatation.There is a mildly prominent left periaortic lymph node at the level of the left renal vein (series 2, image 62).     PELVIS: The urinary bladder is partially distended and unremarkable.  The prostate is not enlarged. There is no pelvic lymphadenopathy.     SOFT TISSUES: Grossly unremarkable.     BONES: There is a prominent sclerotic lesion within the right iliac bone, nonspecific, attention on follow-up..       IMPRESSION:     1.  Interval increase in size of left lower lobe posterior hilar mass with postobstructive pneumonia versus tumor infiltration, corresponding to patient's biopsy proven squamous cell lung cancer.   This lesion has increased in size and now appears to involve the left main bronchus with associated narrowing, as well as infiltrates/involves the left inferior pulmonary vein. In addition the mass lesion abuts the distal esophagus without a distinct fat plane identified, the esophagus is also likely infiltrated.   2. Hypoattenuating lesion is seen within the right lobe of the liver, suspicious for metastatic disease.   3. Multiple lung nodules are seen within the right lower lobe, similar in size to  May 2021, nonspecific, attention on follow-up.   4. Severe emphysema.   5. Similar ectasia of the ascending aorta.    --------------------------------------------------------------------  [facility] MYOCARDIAL PERFUSION SPECT MULTI    Result Date: 4/19/2022  EXAM: NM MYOCARDIAL PERFUSION SPECT MULTI CLINICAL INDICATION: Chest pain. COMPARISON: CTA coronary arteries 04/18/2022. PROCEDURE: 10.2 mCi of Tc-99m tetrofosmin was administered intravenously and resting SPECT myocardial perfusion imaging was performed. A rest-stress protocol was performed. Pharmacologic stress was performed using 0.4 mg of Regadenoson. At peak stress, 28.0 mCi of Tc-99m tetrofosmin was administered intravenously. Post stress gated SPECT myocardial perfusion imaging was then performed with post stress wall motion study and quantification of the post stress LV ejection fraction.  FINDINGS: Images were evaluated directly on the SWEE[employer]O cardiac workstation. The rotating planar images show no significant patient motion.  Left ventricular size is normal, without evidence of transient ischemic dilatation.  SPECT perfusion images show no fixed perfusion defects.  Small reversible perfusion defect in the anterior wall.  Gated SPECT analysis of left ventricular function shows normal left ventricular contractility with no regional wall motion abnormalities and estimated left ventricular ejection fraction of 45% at stress and 50% at rest.     1.   Abnormal myocardial perfusion study.  Small reversible perfusion defect in the anterior wall.  No fixed perfusion defects. 2.   Estimated left ventricular ejection fraction is 45% at stress and 50% at rest. 3.   Normal wall motion and contractility. Dictated by: LEONELA KING DO Dictated on: 4/19/2022 3:04 PM A [employer] notification was sent by Dr. Jessi Tinoco to Dr. Ramírez Christopher, with read confirmation at 4/19/2022 4:20 PM. Dictated by: LEONELA KING DO Dictated on: 4/19/2022 3:04 PM JESSI CAT M.D.,  have reviewed the images and report and concur with these findings interpreted by LEONELA KING DO. Electronically Signed by: JESSI AREVALO M.D. Signed on: 2022 4:39 PM     Stress test only    *[facility]* [address] [place] [id number] Phone [phone] Stress Electrocardiography Regadenoson (Lexiscan) Patient: José Ramirez    Study Date/Time:    2022 1:42PM MRN:     [id number]                FIN#:               [id number] :     1957             Ht/Wt:              175.3cm 57.3kg Age:     64                     BSA/BMI:            1.66m^2 18.6kg/m^2 Gender:  M                      Baseline BP:        123 / 76 Ordering Physician:           Antonieta Kirby Referring Physician:          Antonieta Kirby  Attending Physician:          Rowan Gill Diagnostic Physician:         Abelino Ford MD Technologist:                 Marilynn Palomino Nurse:                        Yasmine Cueto RN -------------------------------------------------------------------------- NUCLEAR IMAGING RESULTS REPORTED SEPARATELY. -------------------------------------------------------------------------- Indications:   Dyspnea, chest pain. -------------------------------------------------------------------------- Study Conclusions Summary: 1. Stress ECG conclusions: There are no stress arrhythmias or conduction    abnormalities. Rare PAC and PVCs post-infusion. The stress ECG is    normal. 2. Baseline ECG: Sinus bradycardia.Prominent precordial R wave voltage.    Delay in precordial R wave progression. Nonspecific repolarization    changes. Impressions:  Normal stress ECG. Nuclear Images to be reported separately by radiology. -------------------------------------------------------------------------- Risk factors:  Hypertension. Diabetes mellitus. Dyslipidemia. Labs, prior tests, procedures, and surgery: Coronary artery bypass grafting. Electrocardiography.     Abnormal.  -------------------------------------------------------------------------- Study data:   Study status:  Routine.  Location:  Stress laboratory. Patient status:  Inpatient.  Consent:  The risks, benefits, and alternatives to the procedure were explained to the patient.  Study completion:  The patient tolerated the procedure well. There were no complications. -------------------------------------------------------------------------- Procedure data:  Initial setup. The patient was brought to the laboratory. A baseline ECG was recorded. Intravenous access was obtained. Surface ECG leads and blood pressure measurements were monitored. Supplemental oxygen. Oxygen, 3L/min was administered by nasal cannula throughout the procedure.  Regadenoson (Lexiscan) stress test. Regadenoson (Lexiscan) was administered by intravenous bolus, followed by a 5ml saline flush. The total dose was 0.4mgover 10.00sec. The infusion was terminated because protocol. Exercise testing was performed using the Brittni protocol. The patient exercised for 4 min, to a maximal work rate of 1mets. -------------------------------------------------------------------------- Baseline ECG:  Sinus bradycardia.Prominent precordial R wave voltage. Delay in precordial R wave progression. Nonspecific repolarization changes. -------------------------------------------------------------------------- Cardiac stress table: +----------------+--+-----------+------------+---------------------------+ !Stage           !HR!BP         !Symptoms    !Comments                   ! +----------------+--+-----------+------------+---------------------------+ !PREINFSN SUPINE !54!123/76 (92)!------------!SPO2 %95 ON 3L. RESTING CP ! !                !  !           !            !1.5/10. RESTING SOB. TIME  ! !                !  !           !            !OUT DONE.                  ! +----------------+--+-----------+------------+---------------------------+ !INFUSION DOSE 1 !56!123/72  (89)!------------!Lexiscan Injected.         ! +----------------+--+-----------+------------+---------------------------+ !INFUSION DOSE 2 !85!112/62 (79)!------------!Isotope Injected. HEAD     ! !                !  !           !            !SYMPTOM. SPO2 96%.         ! +----------------+--+-----------+------------+---------------------------+ !INFUSION DOSE 3 !80!128/69 (89)!------------!---------------------------! +----------------+--+-----------+------------+---------------------------+ !INFUSION DOSE 4 !73!124/66 (85)!------------!---------------------------! +----------------+--+-----------+------------+---------------------------+ !POSTINFSN; 2 min!73!126/67 (87)!------------!BREATHING AND CHEST PAIN   ! !                !  !           !            !THE SAME AS BASELINE, RE.  ! !                !  !           !            !BELLY DISCOMFORT.          ! +----------------+--+-----------+------------+---------------------------+ !POSTINFSN; 4 min!69!133/70 (91)!------------!---------------------------! +----------------+--+-----------+------------+---------------------------+ !POSTINFSN; 6 min!69!129/68 (88)!------------!---------------------------! +----------------+--+-----------+------------+---------------------------+ !Baseline        !--!-----------!No symptoms.!---------------------------! +----------------+--+-----------+------------+---------------------------+ Stress results:   Maximal heart rate during stress was 85bpm (55% of maximal predicted heart rate). The maximal predicted heart rate was 154bpm.The target heart rate was 131bpm.The target heart rate was not achieved. There is a normal resting blood pressure with an appropriate response to stress. The rate-pressure product for the peak heart rate and blood pressure was 85930ro Hg/min. The patient experienced mild chest pain and dyspnea during stress, unchanged from prior to stress. The examination was terminated due to the conclusion of the  examination protocol. Stress ECG:  There are no stress arrhythmias or conduction abnormalities. The stress ECG is normal.                      Prepared and electronically signed by: Abelino Ford MD 04/19/2022 14:57       PATHOLOGY:   Fine Needle Aspiration  Specimen:  Tissue - Specimen from lung (specimen)  Component 10 mo ago   Case Report  [facility]                                               Case: [id number]                                   Authorizing Provider:  Kaiden Arango MD             Collected:           06/24/2021 1146               Ordering Location:     [facility]          Received:            06/25/2021 0346               Pathologist:           Elijah Root MD, DDS                                                     Specimen:    Lung, Left Lower Lobe                                                                   Final Diagnosis     LUNG, LEFT, LOWER LOBE; ENDOBRONCHIAL ULTRASOUND GUIDED NEEDLE ASPIRATION:  -- Positive for malignant-appearing cells, consistent with squamous cell carcinoma  -- See comment   Electronically signed by Elijah Root MD, DDS on 6/29/2021 at  2:57 PM   Comment     Immunohistochemical staining performed on block A1:  (Reported test results are for the malignant-appearing cells or as otherwise noted)     CK5/6:  Positive  p40:  Positive  TTF1:  Largely negative            IMPRESSION / PLAN:    64-year-old man with history of HIV, coronary artery disease status post CABG and PCI, TIA, hyperlipidemia, hypertension, squamous cell carcinoma of the left lung who presented with symptoms of chest pain.  He has had extensive cardiac evaluation while hospitalized.  It appears that many of his symptoms are related to untreated squamous cell carcinoma of the left lower lung.        -- Locally advanced squamous cell carcinoma of the right lung, likely stage IV secondary to liver metastasis.  He was previously offered definitive treatment with chemotherapy and radiation at [facility] for  stage III squamous cell carcinoma of the lung but did not pursue treatment due to concerns of treatment related toxicity.  Recent imaging in February 2022 demonstrates findings highly suggestive of disease progression with hypoattenuating liver lesions suspicious for metastatic disease.  Discussed clinical situation.  Noted that recent imaging at [facility] was suggestive of disease progression, with likely metastatic disease in the liver.  Noted that his current symptoms of left-sided chest pain and difficulty swallowing are likely related to progressive enlargement of the left lower lung mass compression of the esophagus.  Patient does not appear motivated to pursue disease modifying therapy  due to concerns of toxicity.  Recommended optimization of supportive care.  Discussed options for palliative care versus hospice care.  Noted that he is a candidate for hospice care.  Recommend: Palliative care consult.    -- Dysphagia and dyspepsia.  Likely secondary to esophageal compression from left lower lobe mass.  Discussed options for managing extrinsic esophageal compression, stent and radiotherapy.  Patient concerned about the toxicities/consequences of this approach.  Speech/swallow report reviewed.  Recommend: Nutritional supplements such as Ensure/boost.  Consider PPI.    -- Chest pain secondary to neoplasm.  He declined fentanyl.  As needed hydromorphone has been ordered.  He would likely benefit from a long-acting opioid.    -- Opioid-induced constipation.  Senokot started today.  He may need regular bowel regimen laxatives MiraLAX..    -- Chronic hypoxia.  He notes that he has been on supplemental oxygen therapy of that for the past 3 months.    -- Risk for thromboembolism.  Continue enoxaparin.        Discuss plan of care with Dr Tyler.     Thank you very much for allowing me to participate in the care of this pleasant patient.    Penny Armstrong MD         Alert and oriented to person, place and time

## 2022-05-12 NOTE — ED PROVIDER NOTE - PROGRESS NOTE DETAILS
PAST SURGICAL HISTORY:  No significant past surgical history        Klepfish: US w/ L varicocele, no torsion. clinically not torsion. Clinically no indication for further emergent ED workup or hospitalization at this time. Comfortable for dc, outpt f/u.

## 2023-10-10 NOTE — ED ADULT NURSE NOTE - IN THE PAST 12 MONTHS HAVE YOU USED DRUGS OTHER THAN THOSE REQUIRED FOR MEDICAL REASON?
----- Message from Elizabeth Lockett sent at 10/10/2023 11:55 AM CDT -----  MEDICATION REFILL REQUEST:     Call urgency: routine    Patient name: Jose Moon    Patient phone number: [phone]    Requested Medication: FLUoxetine 20 MG capsule    Has Pt contacted Pharmacy : yes    Preferred Pharmacy: [address]     Last completed appt date: 09/25/2023    Next appt date: 10/30/2023    Pt Message: During my appt on 09/25/2023, I did let her know that I wanted to fill and start taking the medication, when I went to the pharmacy I was told that there was nothing there for me. Pt would like to start taking the medication now, before his next appointment on the 30th.     Thanks,  Elizabeth Lockett  10/10/2023, 11:55 AM          No